# Patient Record
Sex: MALE | Race: WHITE | NOT HISPANIC OR LATINO | Employment: STUDENT | URBAN - METROPOLITAN AREA
[De-identification: names, ages, dates, MRNs, and addresses within clinical notes are randomized per-mention and may not be internally consistent; named-entity substitution may affect disease eponyms.]

---
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
## 2018-03-26 ENCOUNTER — HOSPITAL ENCOUNTER (EMERGENCY)
Facility: HOSPITAL | Age: 18
End: 2018-03-26
Attending: EMERGENCY MEDICINE

## 2018-03-26 VITALS
HEIGHT: 72 IN | DIASTOLIC BLOOD PRESSURE: 80 MMHG | TEMPERATURE: 99 F | SYSTOLIC BLOOD PRESSURE: 141 MMHG | BODY MASS INDEX: 32.23 KG/M2 | RESPIRATION RATE: 18 BRPM | HEART RATE: 101 BPM | WEIGHT: 238 LBS | OXYGEN SATURATION: 98 %

## 2018-03-26 DIAGNOSIS — S02.670B: ICD-10-CM
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
## 2018-03-26 DIAGNOSIS — S00.83XA FACIAL CONTUSION, INITIAL ENCOUNTER: Primary | ICD-10-CM
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
## 2018-03-26 LAB
ALBUMIN SERPL BCP-MCNC: 4.6 G/DL
ALP SERPL-CCNC: 57 U/L
ALT SERPL W/O P-5'-P-CCNC: 67 U/L
ANION GAP SERPL CALC-SCNC: 12 MMOL/L
AST SERPL-CCNC: 40 U/L
BASOPHILS # BLD AUTO: 0.05 K/UL
BASOPHILS NFR BLD: 0.3 %
BILIRUB SERPL-MCNC: 0.5 MG/DL
BUN SERPL-MCNC: 16 MG/DL
CALCIUM SERPL-MCNC: 9.8 MG/DL
CHLORIDE SERPL-SCNC: 106 MMOL/L
CO2 SERPL-SCNC: 22 MMOL/L
CREAT SERPL-MCNC: 1.1 MG/DL
DIFFERENTIAL METHOD: ABNORMAL
EOSINOPHIL # BLD AUTO: 0.1 K/UL
EOSINOPHIL NFR BLD: 0.3 %
ERYTHROCYTE [DISTWIDTH] IN BLOOD BY AUTOMATED COUNT: 13 %
EST. GFR  (AFRICAN AMERICAN): >60 ML/MIN/1.73 M^2
EST. GFR  (NON AFRICAN AMERICAN): >60 ML/MIN/1.73 M^2
GLUCOSE SERPL-MCNC: 94 MG/DL
HCT VFR BLD AUTO: 49.9 %
HGB BLD-MCNC: 16.8 G/DL
IMM GRANULOCYTES # BLD AUTO: 0.08 K/UL
IMM GRANULOCYTES NFR BLD AUTO: 0.5 %
INR PPP: 0.9
LYMPHOCYTES # BLD AUTO: 1.6 K/UL
LYMPHOCYTES NFR BLD: 9.6 %
MCH RBC QN AUTO: 29.3 PG
MCHC RBC AUTO-ENTMCNC: 33.7 G/DL
MCV RBC AUTO: 87 FL
MONOCYTES # BLD AUTO: 0.6 K/UL
MONOCYTES NFR BLD: 3.4 %
NEUTROPHILS # BLD AUTO: 13.9 K/UL
NEUTROPHILS NFR BLD: 85.9 %
NRBC BLD-RTO: 0 /100 WBC
PLATELET # BLD AUTO: 249 K/UL
PMV BLD AUTO: 8.9 FL
POTASSIUM SERPL-SCNC: 4.1 MMOL/L
PROT SERPL-MCNC: 8 G/DL
PROTHROMBIN TIME: 9.8 SEC
RBC # BLD AUTO: 5.73 M/UL
SODIUM SERPL-SCNC: 140 MMOL/L
WBC # BLD AUTO: 16.17 K/UL

## 2018-03-26 PROCEDURE — 85610 PROTHROMBIN TIME: CPT

## 2018-03-26 PROCEDURE — 85025 COMPLETE CBC W/AUTO DIFF WBC: CPT

## 2018-03-26 PROCEDURE — 96376 TX/PRO/DX INJ SAME DRUG ADON: CPT

## 2018-03-26 PROCEDURE — 99285 EMERGENCY DEPT VISIT HI MDM: CPT | Mod: 25

## 2018-03-26 PROCEDURE — 99285 EMERGENCY DEPT VISIT HI MDM: CPT | Mod: ,,, | Performed by: EMERGENCY MEDICINE

## 2018-03-26 PROCEDURE — 96361 HYDRATE IV INFUSION ADD-ON: CPT

## 2018-03-26 PROCEDURE — 96365 THER/PROPH/DIAG IV INF INIT: CPT

## 2018-03-26 PROCEDURE — 80053 COMPREHEN METABOLIC PANEL: CPT

## 2018-03-26 PROCEDURE — 90715 TDAP VACCINE 7 YRS/> IM: CPT | Performed by: EMERGENCY MEDICINE

## 2018-03-26 PROCEDURE — 25000003 PHARM REV CODE 250: Performed by: EMERGENCY MEDICINE

## 2018-03-26 PROCEDURE — 63600175 PHARM REV CODE 636 W HCPCS: Performed by: EMERGENCY MEDICINE

## 2018-03-26 PROCEDURE — S0077 INJECTION, CLINDAMYCIN PHOSP: HCPCS | Performed by: EMERGENCY MEDICINE

## 2018-03-26 PROCEDURE — 90471 IMMUNIZATION ADMIN: CPT | Performed by: EMERGENCY MEDICINE

## 2018-03-26 PROCEDURE — 96375 TX/PRO/DX INJ NEW DRUG ADDON: CPT

## 2018-03-26 RX ORDER — CLINDAMYCIN PHOSPHATE 900 MG/50ML
900 INJECTION, SOLUTION INTRAVENOUS
Status: COMPLETED | OUTPATIENT
Start: 2018-03-26 | End: 2018-03-26

## 2018-03-26 RX ORDER — FENTANYL CITRATE 50 UG/ML
50 INJECTION, SOLUTION INTRAMUSCULAR; INTRAVENOUS
Status: COMPLETED | OUTPATIENT
Start: 2018-03-26 | End: 2018-03-26

## 2018-03-26 RX ADMIN — SODIUM CHLORIDE, POTASSIUM CHLORIDE, SODIUM LACTATE AND CALCIUM CHLORIDE 1000 ML: 600; 310; 30; 20 INJECTION, SOLUTION INTRAVENOUS at 03:03

## 2018-03-26 RX ADMIN — CLOSTRIDIUM TETANI TOXOID ANTIGEN (FORMALDEHYDE INACTIVATED), CORYNEBACTERIUM DIPHTHERIAE TOXOID ANTIGEN (FORMALDEHYDE INACTIVATED), BORDETELLA PERTUSSIS TOXOID ANTIGEN (GLUTARALDEHYDE INACTIVATED), BORDETELLA PERTUSSIS FILAMENTOUS HEMAGGLUTININ ANTIGEN (FORMALDEHYDE INACTIVATED), BORDETELLA PERTUSSIS PERTACTIN ANTIGEN, AND BORDETELLA PERTUSSIS FIMBRIAE 2/3 ANTIGEN 0.5 ML: 5; 2; 2.5; 5; 3; 5 INJECTION, SUSPENSION INTRAMUSCULAR at 03:03

## 2018-03-26 RX ADMIN — CLINDAMYCIN IN 5 PERCENT DEXTROSE 900 MG: 18 INJECTION, SOLUTION INTRAVENOUS at 02:03

## 2018-03-26 RX ADMIN — FENTANYL CITRATE 50 MCG: 50 INJECTION, SOLUTION INTRAMUSCULAR; INTRAVENOUS at 04:03

## 2018-03-26 RX ADMIN — FENTANYL CITRATE 50 MCG: 50 INJECTION, SOLUTION INTRAMUSCULAR; INTRAVENOUS at 03:03
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
## 2018-03-26 NOTE — ED NOTES
Pt resting comfortably and independently repositioned in stretcher with bed locked in lowest position for safety. NAD noted at this time. Respirations even and unlabored and visible chest rise noted.  Patient offered bathroom assistance and denies need at this time. Pt instructed to call if assistance is needed. Call light within reach. Family at bedside. No needs at this time. Will continue to monitor.
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
## 2018-03-26 NOTE — ED NOTES
Plan of care explained and pt verbalized understanding - pt also informed on npo status at present

## 2018-03-26 NOTE — ED NOTES
Pt is accepted at [facility] ED - dr. Dixon is the accepting  [phone]

## 2018-03-26 NOTE — ED NOTES
Dominique, friend to contact for transfer [phone]  Win, grandfather if Dominique doesn't answer [phone]

## 2018-12-02 ENCOUNTER — HOSPITAL ENCOUNTER (EMERGENCY)
Facility: HOSPITAL | Age: 18
Discharge: HOME | End: 2018-12-02
Attending: EMERGENCY MEDICINE

## 2018-12-02 VITALS
OXYGEN SATURATION: 99 % | HEIGHT: 71 IN | TEMPERATURE: 98 F | HEART RATE: 90 BPM | SYSTOLIC BLOOD PRESSURE: 133 MMHG | BODY MASS INDEX: 35.81 KG/M2 | WEIGHT: 255.75 LBS | RESPIRATION RATE: 18 BRPM | DIASTOLIC BLOOD PRESSURE: 78 MMHG

## 2018-12-02 DIAGNOSIS — L02.91 ABSCESS: Primary | ICD-10-CM

## 2018-12-02 PROCEDURE — 99282 EMERGENCY DEPT VISIT SF MDM: CPT | Mod: ,,, | Performed by: PHYSICIAN ASSISTANT

## 2018-12-02 PROCEDURE — 99283 EMERGENCY DEPT VISIT LOW MDM: CPT

## 2018-12-03 NOTE — ED NOTES
Two patient identifiers checked and confirmed.    APPEARANCE: Resting comfortably in no acute distress. Patient has clean hair, skin and nails. Clothing is appropriate and properly fastened.  NEURO: Awake, alert, appropriate for age, and cooperative with a calm affect; pupils equal and round. Oriented x4.  HEENT: Head symmetrical. Bilateral eyes without redness or drainage.  CARDIAC: Regular rate and rhythm. S1, S2 noted.  RESPIRATORY: Airway is open and patent. Respirations are spontaneous on room air, even and unlabored. Normal respiratory effort and rate noted.   GI/: Abdomen soft and non-distended. Patient is reported to void and stool appropriately for age.   NEUROVASCULAR: All extremities are warm and pink with +2 pulses and capillary refill less than 3 seconds. No peripheral edema noted.  MUSCULOSKELETAL: Moves all extremities well; no obvious deformities noted. Pt ambulated independently with steady gait.   SKIN: Warm, dry, and intact. Mucus membranes moist and pink. Small raised red non draining bump noted to pts left neck area. Area around the raised bump reddened.   PSYCH: Pt has calm affect, appropriate speech and thought process.

## 2018-12-03 NOTE — ED TRIAGE NOTES
Pt reports shaving his neck when he cut an area on his left neck where a large hair was leaving a small raised bump. States bump is now going down but area around the bump is red and painful. Denies drainage or fevers.

## 2018-12-03 NOTE — ED PROVIDER NOTES
"Encounter Date: 12/2/2018       History     Chief Complaint   Patient presents with    Cellulitis     skin infection to left side of his neck x 3-4 days 5/10 pain scale w/ redness.      18-year-old white male with history of ADD presents to the ED complaining "staph infection" on the left side of his neck.  States that he was shaving yesterday when he accidentally cut himself.  He developed an area of swelling and redness which he had his girlfriend popped with a needle.  They expressed some blood and a minimal amount of pus.  He has been persistently squeezing the region.  He reports associated pain that was so bad he had to call in to work. He has not applied any cream to the region. He is not currently on any abx. Denies f/c, chest pain, SOB, abdominal pain, headache. Smokes 1ppd.       The history is provided by the patient.     Review of patient's allergies indicates:  No Known Allergies  Past Medical History:   Diagnosis Date    ADD (attention deficit disorder)     Bipolar disorder      Past Surgical History:   Procedure Laterality Date    FRACTURE SURGERY      MANDIBLE SURGERY      TONSILLECTOMY      TYMPANOSTOMY TUBE PLACEMENT       Family History   Problem Relation Age of Onset    No Known Problems Mother     No Known Problems Father      Social History     Tobacco Use    Smoking status: Current Every Day Smoker     Packs/day: 1.00     Types: Cigarettes    Smokeless tobacco: Never Used   Substance Use Topics    Alcohol use: No    Drug use: No     Review of Systems   Constitutional: Negative for chills and fever.   HENT: Negative for congestion, rhinorrhea and sore throat.    Eyes: Negative for photophobia and visual disturbance.   Respiratory: Negative for shortness of breath.    Cardiovascular: Negative for chest pain.   Gastrointestinal: Negative for abdominal pain, constipation, diarrhea, nausea and vomiting.   Genitourinary: Negative for dysuria and hematuria.   Musculoskeletal: Negative " "for neck pain and neck stiffness.   Skin: Positive for color change and wound.   Neurological: Negative for numbness.   Psychiatric/Behavioral: Negative for confusion.       Physical Exam     Initial Vitals [12/02/18 1915]   BP Pulse Resp Temp SpO2   133/78 90 18 97.5 °F (36.4 °C) 99 %      MAP       --         Physical Exam    Nursing note and vitals reviewed.  Constitutional: He appears well-developed and well-nourished. He is not diaphoretic. No distress.   HENT:   Head: Normocephalic and atraumatic.   Neck: Normal range of motion. Neck supple.       Cardiovascular: Normal rate, regular rhythm and normal heart sounds. Exam reveals no gallop and no friction rub.    No murmur heard.  Pulmonary/Chest: Breath sounds normal. He has no wheezes. He has no rhonchi. He has no rales.   Abdominal: Soft. Bowel sounds are normal. There is no tenderness. There is no rebound and no guarding.   Musculoskeletal: Normal range of motion.   Neurological: He is alert and oriented to person, place, and time.   Skin: Skin is warm and dry. No erythema.   Psychiatric: He has a normal mood and affect.         ED Course   Procedures  Labs Reviewed - No data to display       Imaging Results    None                APC / Resident Notes:   18-year-old white male with history of ADD presents to the ED complaining "staph infection" on the left side of his neck. VSS. There is a small area of erythema noted to the L neck, tender to palpation. No fluctuance. No surrounding cellulitis. I do not feel that he needs I&D or abx at this time. Instructed to apply warm compresses. Stable for discharge.    He was discharged without any new prescriptions.  He will follow up with his PCP.  All of the patient's questions were answered.  I reviewed the patient's chart and discussed the case with my supervising physician.                    Clinical Impression:   The encounter diagnosis was Abscess.      Disposition:   Disposition: Discharged  Condition: " Stable                        Gita Rose PA-C  12/02/18 7241

## 2020-12-01 ENCOUNTER — HOSPITAL ENCOUNTER (EMERGENCY)
Facility: HOSPITAL | Age: 20
Discharge: HOME | End: 2020-12-01
Attending: EMERGENCY MEDICINE
Payer: MEDICAID

## 2020-12-01 VITALS
SYSTOLIC BLOOD PRESSURE: 140 MMHG | RESPIRATION RATE: 18 BRPM | WEIGHT: 257.25 LBS | OXYGEN SATURATION: 97 % | HEART RATE: 93 BPM | DIASTOLIC BLOOD PRESSURE: 85 MMHG | TEMPERATURE: 99 F | BODY MASS INDEX: 34.84 KG/M2 | HEIGHT: 72 IN

## 2020-12-01 DIAGNOSIS — M70.52 BURSITIS OF LEFT KNEE, UNSPECIFIED BURSA: Primary | ICD-10-CM

## 2020-12-01 PROCEDURE — 99284 PR EMERGENCY DEPT VISIT,LEVEL IV: ICD-10-PCS | Mod: ,,, | Performed by: NURSE PRACTITIONER

## 2020-12-01 PROCEDURE — 99284 EMERGENCY DEPT VISIT MOD MDM: CPT | Mod: ,,, | Performed by: NURSE PRACTITIONER

## 2020-12-01 PROCEDURE — 25000003 PHARM REV CODE 250: Performed by: NURSE PRACTITIONER

## 2020-12-01 PROCEDURE — 99283 EMERGENCY DEPT VISIT LOW MDM: CPT

## 2020-12-01 RX ORDER — IBUPROFEN 600 MG/1
600 TABLET ORAL EVERY 6 HOURS PRN
Qty: 20 TABLET | Refills: 0 | Status: SHIPPED | OUTPATIENT
Start: 2020-12-01 | End: 2020-12-06

## 2020-12-01 RX ORDER — IBUPROFEN 600 MG/1
600 TABLET ORAL
Status: COMPLETED | OUTPATIENT
Start: 2020-12-01 | End: 2020-12-01

## 2020-12-01 RX ADMIN — IBUPROFEN 600 MG: 600 TABLET, FILM COATED ORAL at 09:12

## 2020-12-01 NOTE — DISCHARGE INSTRUCTIONS
We have prescribed you ibuprofen for your pain.  We have applied an Ace wrap for swelling.  Ice or heat for comfort.  Follow-up with PCP if symptoms do not resolve in the next 10 days    Our goal in the emergency department is to always give you outstanding care and exceptional service. You may receive a survey by mail or e-mail in the next week regarding your experience in our ED. We would greatly appreciate your completing and returning the survey. Your feedback provides us with a way to recognize our staff who give very good care and it helps us learn how to improve when your experience was below our aspiration of excellence.

## 2020-12-01 NOTE — ED PROVIDER NOTES
Encounter Date: 12/1/2020       History     Chief Complaint   Patient presents with    Knee Injury     L knee swelling      Patient is a 20-year-old male with medical history of bipolar disorder, ADD presenting to the ED for slight left knee swelling for the past few weeks.  Patient states he had a fall from a horse a few months ago and has had intermittent swelling since that time.  Patient does work on his feet and states swelling is worse after working.  Patient denies any recent falls, trauma, insect bites, redness.  Patient states he has been using ice for swelling but has not taking anything for pain.    The history is provided by the patient.     Review of patient's allergies indicates:  No Known Allergies  Past Medical History:   Diagnosis Date    ADD (attention deficit disorder)     Bipolar disorder      Past Surgical History:   Procedure Laterality Date    FRACTURE SURGERY      MANDIBLE SURGERY      TONSILLECTOMY      TYMPANOSTOMY TUBE PLACEMENT       Family History   Problem Relation Age of Onset    No Known Problems Mother     No Known Problems Father      Social History     Tobacco Use    Smoking status: Current Every Day Smoker     Packs/day: 1.00     Types: Cigarettes    Smokeless tobacco: Never Used   Substance Use Topics    Alcohol use: No    Drug use: No     Review of Systems   Constitutional: Negative for fever.   HENT: Negative for sore throat.    Respiratory: Negative for shortness of breath.    Cardiovascular: Negative for chest pain.   Gastrointestinal: Negative for nausea.   Genitourinary: Negative for dysuria.   Musculoskeletal: Positive for arthralgias ( left knee) and joint swelling ( left knee). Negative for back pain and gait problem.   Skin: Negative for color change, rash and wound.   Neurological: Negative for weakness.   Hematological: Does not bruise/bleed easily.   All other systems reviewed and are negative.      Physical Exam     Initial Vitals [12/01/20 0820]   BP  Pulse Resp Temp SpO2   (!) 140/85 93 18 99 °F (37.2 °C) 97 %      MAP       --         Physical Exam    Nursing note and vitals reviewed.  Constitutional: Vital signs are normal. He appears well-developed and well-nourished. He is cooperative. No distress.   HENT:   Head: Normocephalic and atraumatic.   Mouth/Throat: Mucous membranes are normal.   Eyes: EOM and lids are normal. Pupils are equal, round, and reactive to light.   Neck: Trachea normal, normal range of motion and phonation normal. Neck supple.   Cardiovascular: Normal rate, regular rhythm and intact distal pulses.   Pulses:       Radial pulses are 2+ on the right side and 2+ on the left side.   Pulmonary/Chest: Effort normal and breath sounds normal.   Abdominal: Normal appearance.   Musculoskeletal:      Left hip: Normal.      Left knee: He exhibits decreased range of motion and swelling ( slight). He exhibits no effusion, no ecchymosis, no deformity, no laceration, no erythema, normal patellar mobility, no bony tenderness and normal meniscus. Tenderness found.      Left upper leg: Normal.      Left lower leg: Normal.   Neurological: He is alert and oriented to person, place, and time. He has normal strength. No sensory deficit. He displays a negative Romberg sign. GCS eye subscore is 4. GCS verbal subscore is 5. GCS motor subscore is 6.   Skin: Skin is warm, dry and intact. Capillary refill takes 2 to 3 seconds. No abrasion, no laceration and no rash noted. No cyanosis. Nails show no clubbing.         ED Course   Procedures  Labs Reviewed - No data to display       Imaging Results    None          Medical Decision Making:   Initial Assessment:   Emergent evaluation by 20-year-old male presenting to the ED for nontraumatic left knee swelling and slight pain.  Patient states symptoms have been ongoing for the past few weeks.  Patient states symptoms are worse after a fall from a horse a few months ago.  Patient denies taking anything for pain but has  applied ice for swelling.  Patient denies any recent falls, trauma or insect bites.  On exam patient is A&O x3.  Vital signs stable.  Not febrile and nontoxic appearing.  Strength 5/5 in bilateral lower extremities.  Steady gait appreciated.  No reproducible pain to palpation to left knee.  Good extension flexion of left knee.  No redness, erythema or drainage noted.  Slight swelling appreciated on exam.  No clinical signs of effusion noted.  Differential Diagnosis:   Differential diagnoses include but are not limited to tear, ACL tear, tendonitis, bursitis, gout,arthritis, others.  I considered but do not suspect dislocation,  septic joint, fracture.  ED Management:  I do not feel labs or imaging are pertinent for the care this patient.  We will medicate, apply Ace wrap and discharged home.    Patient advised ice or heat for comfort.  Maintain movement and stretches.  Ibuprofen for pain.  Follow-up with PCP or ortho as needed if symptoms do not resolve.  Patient verbalized understanding of this plan of care.  All questions and concerns addressed.    Patient is hemodynamically stable, vital signs are normal. Discharge instructions given. Return to ED precautions discussed. Follow up as directed. Pt verbalized understanding of this plan.  Pt is stable for discharge.                              Clinical Impression:       ICD-10-CM ICD-9-CM   1. Bursitis of left knee, unspecified bursa  M70.52 726.60                      Disposition:   Disposition: Discharged  Condition: Stable     ED Disposition Condition    Discharge Stable        ED Prescriptions     Medication Sig Dispense Start Date End Date Auth. Provider    ibuprofen (ADVIL,MOTRIN) 600 MG tablet Take 1 tablet (600 mg total) by mouth every 6 (six) hours as needed for Pain. 20 tablet 12/1/2020 12/6/2020 Danica Rush NP        Follow-up Information     Follow up With Specialties Details Why Contact Info Additional Information    [facility]  [place]  Schedule an appointment as soon as possible for a visit  As needed [address] [facility] [address]  [phone]       [facility] [address] Orthopedics Schedule an appointment as soon as possible for a visit  As needed, If symptoms worsen [address], [address]  [facility] [address] [phone] [facility] - Main Building, 5th Floor Please park in [facility] and take [address] elevator                                       Danica Rush NP  12/01/20 0901

## 2020-12-01 NOTE — ED TRIAGE NOTES
Pt reports he fell off a horse a few months ago and has been having intermittent L knee pain since.  Reports this am L knee started hurting again with some swelling.  Denies recent injury.

## 2020-12-01 NOTE — Clinical Note
"Jay Kennedy" Cory was seen and treated in our emergency department on 12/1/2020.  He may return to work on 12/02/2020.       If you have any questions or concerns, please don't hesitate to call.      Danica Rush NP"

## 2020-12-22 ENCOUNTER — HOSPITAL ENCOUNTER (EMERGENCY)
Facility: HOSPITAL | Age: 20
Discharge: HOME | End: 2020-12-22
Attending: EMERGENCY MEDICINE
Payer: MEDICAID

## 2020-12-22 VITALS
DIASTOLIC BLOOD PRESSURE: 72 MMHG | TEMPERATURE: 98 F | OXYGEN SATURATION: 95 % | HEART RATE: 90 BPM | SYSTOLIC BLOOD PRESSURE: 144 MMHG | RESPIRATION RATE: 20 BRPM

## 2020-12-22 DIAGNOSIS — M25.562 LEFT KNEE PAIN, UNSPECIFIED CHRONICITY: Primary | ICD-10-CM

## 2020-12-22 DIAGNOSIS — S89.92XA KNEE INJURY, LEFT, INITIAL ENCOUNTER: ICD-10-CM

## 2020-12-22 PROCEDURE — 99284 EMERGENCY DEPT VISIT MOD MDM: CPT | Mod: ,,, | Performed by: PHYSICIAN ASSISTANT

## 2020-12-22 PROCEDURE — 99283 EMERGENCY DEPT VISIT LOW MDM: CPT | Mod: 25

## 2020-12-22 PROCEDURE — 99284 PR EMERGENCY DEPT VISIT,LEVEL IV: ICD-10-PCS | Mod: ,,, | Performed by: PHYSICIAN ASSISTANT

## 2020-12-22 NOTE — Clinical Note
"Jay Kennedy" Cory was seen and treated in our emergency department on 12/22/2020.  He may return to work on 12/23/2020.       If you have any questions or concerns, please don't hesitate to call.      TYSON Bhandari/ Donn MACIAS    "

## 2020-12-22 NOTE — DISCHARGE INSTRUCTIONS
Your knee x-ray did not show any broken bones or dislocations.  Use Ace wrap, apply ice, and keep the knee elevated to reduce pain and swelling.  You can continue to take your prescription ibuprofen.  You can also take Tylenol in addition to the ibuprofen as needed for pain.     Follow-up in [facility] within the next week if your symptoms are not improving.    Return to the ER for any new or worsening symptoms such as worsening swelling, numbness or tingling, redness or warmth to the area, fevers greater than 100.4° or any other worrisome symptoms.    Our goal in the emergency department is to always give you outstanding care and exceptional service. You may receive a survey by mail or e-mail in the next week regarding your experience in our ED. We would greatly appreciate your completing and returning the survey. Your feedback provides us with a way to recognize our staff who give very good care and it helps us learn how to improve when your experience was below our aspiration of excellence.
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
## 2021-01-26 ENCOUNTER — HOSPITAL ENCOUNTER (EMERGENCY)
Facility: HOSPITAL | Age: 21
Discharge: HOME | End: 2021-01-26
Attending: EMERGENCY MEDICINE
Payer: MEDICAID

## 2021-01-26 VITALS
WEIGHT: 251.13 LBS | DIASTOLIC BLOOD PRESSURE: 82 MMHG | HEIGHT: 73 IN | SYSTOLIC BLOOD PRESSURE: 144 MMHG | RESPIRATION RATE: 18 BRPM | TEMPERATURE: 98 F | HEART RATE: 100 BPM | BODY MASS INDEX: 33.28 KG/M2 | OXYGEN SATURATION: 96 %

## 2021-01-26 DIAGNOSIS — M54.50 ACUTE BILATERAL LOW BACK PAIN WITHOUT SCIATICA: Primary | ICD-10-CM

## 2021-01-26 PROCEDURE — 86803 HEPATITIS C AB TEST: CPT

## 2021-01-26 PROCEDURE — 99284 EMERGENCY DEPT VISIT MOD MDM: CPT | Mod: ,,, | Performed by: EMERGENCY MEDICINE

## 2021-01-26 PROCEDURE — 86703 HIV-1/HIV-2 1 RESULT ANTBDY: CPT

## 2021-01-26 PROCEDURE — 25000003 PHARM REV CODE 250: Performed by: EMERGENCY MEDICINE

## 2021-01-26 PROCEDURE — 99284 PR EMERGENCY DEPT VISIT,LEVEL IV: ICD-10-PCS | Mod: ,,, | Performed by: EMERGENCY MEDICINE

## 2021-01-26 PROCEDURE — 99283 EMERGENCY DEPT VISIT LOW MDM: CPT | Mod: 25

## 2021-01-26 RX ORDER — ACETAMINOPHEN 500 MG
1000 TABLET ORAL
Status: COMPLETED | OUTPATIENT
Start: 2021-01-26 | End: 2021-01-26

## 2021-01-26 RX ORDER — ACETAMINOPHEN 500 MG
TABLET ORAL
Status: DISPENSED
Start: 2021-01-26 | End: 2021-01-27

## 2021-01-26 RX ADMIN — ACETAMINOPHEN 1000 MG: 500 TABLET ORAL at 02:01

## 2021-01-28 LAB
HCV AB SERPL QL IA: NEGATIVE
HIV 1+2 AB+HIV1 P24 AG SERPL QL IA: NEGATIVE

## 2021-02-23 ENCOUNTER — HOSPITAL ENCOUNTER (EMERGENCY)
Facility: HOSPITAL | Age: 21
Discharge: HOME | End: 2021-02-23
Attending: EMERGENCY MEDICINE
Payer: MEDICAID

## 2021-02-23 VITALS
TEMPERATURE: 98 F | RESPIRATION RATE: 18 BRPM | BODY MASS INDEX: 33.04 KG/M2 | SYSTOLIC BLOOD PRESSURE: 129 MMHG | HEART RATE: 92 BPM | DIASTOLIC BLOOD PRESSURE: 81 MMHG | WEIGHT: 249.31 LBS | OXYGEN SATURATION: 97 % | HEIGHT: 73 IN

## 2021-02-23 DIAGNOSIS — S83.92XD SPRAIN OF LEFT KNEE, UNSPECIFIED LIGAMENT, SUBSEQUENT ENCOUNTER: Primary | ICD-10-CM

## 2021-02-23 PROCEDURE — 99284 EMERGENCY DEPT VISIT MOD MDM: CPT | Mod: ,,, | Performed by: EMERGENCY MEDICINE

## 2021-02-23 PROCEDURE — 99283 EMERGENCY DEPT VISIT LOW MDM: CPT

## 2021-02-23 PROCEDURE — 99284 PR EMERGENCY DEPT VISIT,LEVEL IV: ICD-10-PCS | Mod: ,,, | Performed by: EMERGENCY MEDICINE

## 2021-02-23 RX ORDER — IBUPROFEN 600 MG/1
600 TABLET ORAL EVERY 8 HOURS PRN
Qty: 20 TABLET | Refills: 0 | Status: SHIPPED | OUTPATIENT
Start: 2021-02-23 | End: 2021-03-02

## 2021-02-24 ENCOUNTER — PATIENT OUTREACH (OUTPATIENT)
Dept: EMERGENCY MEDICINE | Facility: HOSPITAL | Age: 21
End: 2021-02-24

## 2021-04-20 ENCOUNTER — HOSPITAL ENCOUNTER (EMERGENCY)
Facility: HOSPITAL | Age: 21
Discharge: HOME | End: 2021-04-20
Attending: EMERGENCY MEDICINE
Payer: MEDICAID

## 2021-04-20 VITALS
WEIGHT: 252.44 LBS | OXYGEN SATURATION: 96 % | HEIGHT: 73 IN | SYSTOLIC BLOOD PRESSURE: 130 MMHG | TEMPERATURE: 98 F | HEART RATE: 113 BPM | RESPIRATION RATE: 18 BRPM | DIASTOLIC BLOOD PRESSURE: 86 MMHG | BODY MASS INDEX: 33.46 KG/M2

## 2021-04-20 DIAGNOSIS — F17.200 CURRENT SMOKER: ICD-10-CM

## 2021-04-20 DIAGNOSIS — J06.9 URI WITH COUGH AND CONGESTION: Primary | ICD-10-CM

## 2021-04-20 DIAGNOSIS — R05.8 PRODUCTIVE COUGH: ICD-10-CM

## 2021-04-20 LAB
CTP QC/QA: YES
SARS-COV-2 RDRP RESP QL NAA+PROBE: NEGATIVE

## 2021-04-20 PROCEDURE — 99284 EMERGENCY DEPT VISIT MOD MDM: CPT | Mod: CS,,, | Performed by: EMERGENCY MEDICINE

## 2021-04-20 PROCEDURE — 25000003 PHARM REV CODE 250: Performed by: EMERGENCY MEDICINE

## 2021-04-20 PROCEDURE — U0002 COVID-19 LAB TEST NON-CDC: HCPCS | Performed by: EMERGENCY MEDICINE

## 2021-04-20 PROCEDURE — 99284 PR EMERGENCY DEPT VISIT,LEVEL IV: ICD-10-PCS | Mod: CS,,, | Performed by: EMERGENCY MEDICINE

## 2021-04-20 PROCEDURE — 99284 EMERGENCY DEPT VISIT MOD MDM: CPT | Mod: 25

## 2021-04-20 RX ORDER — DOXYCYCLINE 100 MG/1
100 CAPSULE ORAL 2 TIMES DAILY
Qty: 19 CAPSULE | Refills: 0 | Status: SHIPPED | OUTPATIENT
Start: 2021-04-20 | End: 2021-04-30

## 2021-04-20 RX ORDER — CETIRIZINE HYDROCHLORIDE 10 MG/1
10 TABLET ORAL EVERY MORNING
Qty: 30 TABLET | Refills: 0 | Status: SHIPPED | OUTPATIENT
Start: 2021-04-20 | End: 2021-05-20

## 2021-04-20 RX ORDER — FLUTICASONE PROPIONATE 50 MCG
1 SPRAY, SUSPENSION (ML) NASAL 2 TIMES DAILY
Qty: 11.1 ML | Refills: 1 | Status: SHIPPED | OUTPATIENT
Start: 2021-04-20 | End: 2021-06-19

## 2021-04-20 RX ORDER — DOXYCYCLINE HYCLATE 100 MG
100 TABLET ORAL
Status: COMPLETED | OUTPATIENT
Start: 2021-04-20 | End: 2021-04-20

## 2021-04-20 RX ADMIN — DOXYCYCLINE HYCLATE 100 MG: 100 TABLET, COATED ORAL at 07:04

## 2021-05-26 ENCOUNTER — HOSPITAL ENCOUNTER (EMERGENCY)
Facility: HOSPITAL | Age: 21
Discharge: HOME | End: 2021-05-26
Attending: EMERGENCY MEDICINE
Payer: MEDICAID

## 2021-05-26 VITALS
HEART RATE: 86 BPM | TEMPERATURE: 98 F | DIASTOLIC BLOOD PRESSURE: 74 MMHG | RESPIRATION RATE: 20 BRPM | OXYGEN SATURATION: 96 % | HEIGHT: 72 IN | SYSTOLIC BLOOD PRESSURE: 142 MMHG | WEIGHT: 250 LBS | BODY MASS INDEX: 33.86 KG/M2

## 2021-05-26 DIAGNOSIS — M25.562 PAIN AND SWELLING OF KNEE, LEFT: ICD-10-CM
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
## 2021-05-26 DIAGNOSIS — M25.462 PAIN AND SWELLING OF KNEE, LEFT: ICD-10-CM

## 2021-05-26 DIAGNOSIS — M25.561 PAIN AND SWELLING OF KNEE, RIGHT: ICD-10-CM

## 2021-05-26 DIAGNOSIS — M25.461 PAIN AND SWELLING OF KNEE, RIGHT: ICD-10-CM

## 2021-05-26 DIAGNOSIS — M25.561 RECURRENT PAIN OF BOTH KNEES: Primary | ICD-10-CM

## 2021-05-26 DIAGNOSIS — M25.562 RECURRENT PAIN OF BOTH KNEES: Primary | ICD-10-CM

## 2021-05-26 PROCEDURE — 99283 EMERGENCY DEPT VISIT LOW MDM: CPT | Mod: 25

## 2021-05-26 PROCEDURE — 99284 PR EMERGENCY DEPT VISIT,LEVEL IV: ICD-10-PCS | Mod: ,,, | Performed by: EMERGENCY MEDICINE

## 2021-05-26 PROCEDURE — 25000003 PHARM REV CODE 250: Performed by: PHYSICIAN ASSISTANT

## 2021-05-26 PROCEDURE — 99284 EMERGENCY DEPT VISIT MOD MDM: CPT | Mod: ,,, | Performed by: EMERGENCY MEDICINE

## 2021-05-26 RX ORDER — NAPROXEN SODIUM 220 MG
400 TABLET ORAL
COMMUNITY
End: 2021-05-26

## 2021-05-26 RX ORDER — KETOROLAC TROMETHAMINE 10 MG/1
10 TABLET, FILM COATED ORAL
Status: COMPLETED | OUTPATIENT
Start: 2021-05-26 | End: 2021-05-26

## 2021-05-26 RX ORDER — MELOXICAM 15 MG/1
15 TABLET ORAL DAILY
Qty: 10 TABLET | Refills: 0 | Status: SHIPPED | OUTPATIENT
Start: 2021-05-26 | End: 2021-06-05

## 2021-05-26 RX ADMIN — KETOROLAC TROMETHAMINE 10 MG: 10 TABLET, FILM COATED ORAL at 10:05

## 2021-06-22 PROCEDURE — 99283 PR EMERGENCY DEPT VISIT,LEVEL III: ICD-10-PCS | Mod: ,,, | Performed by: EMERGENCY MEDICINE

## 2021-06-22 PROCEDURE — 99283 EMERGENCY DEPT VISIT LOW MDM: CPT | Mod: ,,, | Performed by: EMERGENCY MEDICINE

## 2021-06-22 PROCEDURE — 99283 EMERGENCY DEPT VISIT LOW MDM: CPT | Mod: 25

## 2021-06-22 PROCEDURE — 29130 APPL FINGER SPLINT STATIC: CPT | Mod: F4

## 2021-06-23 ENCOUNTER — HOSPITAL ENCOUNTER (EMERGENCY)
Facility: HOSPITAL | Age: 21
Discharge: HOME | End: 2021-06-23
Attending: EMERGENCY MEDICINE
Payer: MEDICAID

## 2021-06-23 VITALS
OXYGEN SATURATION: 96 % | RESPIRATION RATE: 14 BRPM | DIASTOLIC BLOOD PRESSURE: 95 MMHG | TEMPERATURE: 98 F | SYSTOLIC BLOOD PRESSURE: 121 MMHG | HEART RATE: 95 BPM | WEIGHT: 250 LBS | BODY MASS INDEX: 33.91 KG/M2

## 2021-06-23 DIAGNOSIS — M79.645 FINGER PAIN, LEFT: ICD-10-CM

## 2021-06-23 DIAGNOSIS — S62.639A CLOSED DISPLACED FRACTURE OF DISTAL PHALANX OF FINGER OF LEFT HAND: Primary | ICD-10-CM
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
## 2021-06-23 PROCEDURE — 29130 APPL FINGER SPLINT STATIC: CPT | Mod: F4

## 2021-06-23 PROCEDURE — 25000003 PHARM REV CODE 250: Performed by: EMERGENCY MEDICINE

## 2021-06-23 RX ORDER — IBUPROFEN 600 MG/1
600 TABLET ORAL
Status: COMPLETED | OUTPATIENT
Start: 2021-06-23 | End: 2021-06-23

## 2021-06-23 RX ORDER — IBUPROFEN 600 MG/1
600 TABLET ORAL EVERY 6 HOURS PRN
Qty: 20 TABLET | Refills: 0 | Status: SHIPPED | OUTPATIENT
Start: 2021-06-23 | End: 2021-06-28

## 2021-06-23 RX ADMIN — IBUPROFEN 600 MG: 600 TABLET, FILM COATED ORAL at 12:06

## 2021-08-19 ENCOUNTER — HOSPITAL ENCOUNTER (EMERGENCY)
Facility: HOSPITAL | Age: 21
Discharge: HOME | End: 2021-08-19
Attending: EMERGENCY MEDICINE
Payer: MEDICAID

## 2021-08-19 VITALS
TEMPERATURE: 99 F | HEART RATE: 100 BPM | HEIGHT: 71 IN | DIASTOLIC BLOOD PRESSURE: 89 MMHG | RESPIRATION RATE: 18 BRPM | WEIGHT: 275 LBS | OXYGEN SATURATION: 97 % | SYSTOLIC BLOOD PRESSURE: 147 MMHG | BODY MASS INDEX: 38.5 KG/M2

## 2021-08-19 DIAGNOSIS — R05.9 COUGH: ICD-10-CM

## 2021-08-19 DIAGNOSIS — Z20.822 COVID-19 VIRUS NOT DETECTED: Primary | ICD-10-CM

## 2021-08-19 LAB
CTP QC/QA: YES
SARS-COV-2 RDRP RESP QL NAA+PROBE: NEGATIVE

## 2021-08-19 PROCEDURE — U0002 COVID-19 LAB TEST NON-CDC: HCPCS | Performed by: EMERGENCY MEDICINE

## 2021-08-19 PROCEDURE — 99284 EMERGENCY DEPT VISIT MOD MDM: CPT | Mod: CS,,, | Performed by: PHYSICIAN ASSISTANT

## 2021-08-19 PROCEDURE — 99284 PR EMERGENCY DEPT VISIT,LEVEL IV: ICD-10-PCS | Mod: CS,,, | Performed by: PHYSICIAN ASSISTANT

## 2021-08-19 PROCEDURE — 99282 EMERGENCY DEPT VISIT SF MDM: CPT

## 2021-09-03 ENCOUNTER — HOSPITAL ENCOUNTER (EMERGENCY)
Facility: HOSPITAL | Age: 21
Discharge: HOME | End: 2021-09-03
Attending: EMERGENCY MEDICINE
Payer: MEDICAID

## 2021-09-03 VITALS
SYSTOLIC BLOOD PRESSURE: 139 MMHG | TEMPERATURE: 99 F | HEART RATE: 100 BPM | OXYGEN SATURATION: 98 % | RESPIRATION RATE: 18 BRPM | DIASTOLIC BLOOD PRESSURE: 80 MMHG

## 2021-09-03 DIAGNOSIS — F63.81 INTERMITTENT EXPLOSIVE DISORDER IN ADULT: Chronic | ICD-10-CM

## 2021-09-03 DIAGNOSIS — R45.1 AGITATION: Primary | ICD-10-CM

## 2021-09-03 LAB
ALBUMIN SERPL BCP-MCNC: 4.2 G/DL (ref 3.5–5.2)
ALP SERPL-CCNC: 41 U/L (ref 55–135)
ALT SERPL W/O P-5'-P-CCNC: 26 U/L (ref 10–44)
AMPHET+METHAMPHET UR QL: NEGATIVE
ANION GAP SERPL CALC-SCNC: 9 MMOL/L (ref 8–16)
APAP SERPL-MCNC: <3 UG/ML (ref 10–20)
AST SERPL-CCNC: 24 U/L (ref 10–40)
BARBITURATES UR QL SCN>200 NG/ML: NEGATIVE
BASOPHILS # BLD AUTO: 0.03 K/UL (ref 0–0.2)
BASOPHILS NFR BLD: 0.4 % (ref 0–1.9)
BENZODIAZ UR QL SCN>200 NG/ML: NEGATIVE
BILIRUB SERPL-MCNC: 1 MG/DL (ref 0.1–1)
BILIRUB UR QL STRIP: NEGATIVE
BUN SERPL-MCNC: 9 MG/DL (ref 6–20)
BZE UR QL SCN: NEGATIVE
CALCIUM SERPL-MCNC: 9.2 MG/DL (ref 8.7–10.5)
CANNABINOIDS UR QL SCN: ABNORMAL
CHLORIDE SERPL-SCNC: 105 MMOL/L (ref 95–110)
CLARITY UR REFRACT.AUTO: CLEAR
CO2 SERPL-SCNC: 22 MMOL/L (ref 23–29)
COLOR UR AUTO: NORMAL
CREAT SERPL-MCNC: 0.9 MG/DL (ref 0.5–1.4)
CREAT UR-MCNC: 45 MG/DL (ref 23–375)
CTP QC/QA: YES
DIFFERENTIAL METHOD: ABNORMAL
EOSINOPHIL # BLD AUTO: 0.1 K/UL (ref 0–0.5)
EOSINOPHIL NFR BLD: 1.6 % (ref 0–8)
ERYTHROCYTE [DISTWIDTH] IN BLOOD BY AUTOMATED COUNT: 13 % (ref 11.5–14.5)
EST. GFR  (AFRICAN AMERICAN): >60 ML/MIN/1.73 M^2
EST. GFR  (NON AFRICAN AMERICAN): >60 ML/MIN/1.73 M^2
ETHANOL SERPL-MCNC: <10 MG/DL
GLUCOSE SERPL-MCNC: 113 MG/DL (ref 70–110)
GLUCOSE UR QL STRIP: NEGATIVE
HCT VFR BLD AUTO: 44.9 % (ref 40–54)
HCV AB SERPL QL IA: NEGATIVE
HGB BLD-MCNC: 15.7 G/DL (ref 14–18)
HGB UR QL STRIP: NEGATIVE
HIV 1+2 AB+HIV1 P24 AG SERPL QL IA: NEGATIVE
IMM GRANULOCYTES # BLD AUTO: 0.03 K/UL (ref 0–0.04)
IMM GRANULOCYTES NFR BLD AUTO: 0.4 % (ref 0–0.5)
KETONES UR QL STRIP: NEGATIVE
LEUKOCYTE ESTERASE UR QL STRIP: NEGATIVE
LYMPHOCYTES # BLD AUTO: 1.8 K/UL (ref 1–4.8)
LYMPHOCYTES NFR BLD: 26.5 % (ref 18–48)
MCH RBC QN AUTO: 29.8 PG (ref 27–31)
MCHC RBC AUTO-ENTMCNC: 35 G/DL (ref 32–36)
MCV RBC AUTO: 85 FL (ref 82–98)
METHADONE UR QL SCN>300 NG/ML: NEGATIVE
MONOCYTES # BLD AUTO: 0.3 K/UL (ref 0.3–1)
MONOCYTES NFR BLD: 4.2 % (ref 4–15)
NEUTROPHILS # BLD AUTO: 4.7 K/UL (ref 1.8–7.7)
NEUTROPHILS NFR BLD: 66.9 % (ref 38–73)
NITRITE UR QL STRIP: NEGATIVE
NRBC BLD-RTO: 0 /100 WBC
OPIATES UR QL SCN: NEGATIVE
PCP UR QL SCN>25 NG/ML: NEGATIVE
PH UR STRIP: 7 [PH] (ref 5–8)
PLATELET # BLD AUTO: 268 K/UL (ref 150–450)
PMV BLD AUTO: 8.7 FL (ref 9.2–12.9)
POTASSIUM SERPL-SCNC: 3.8 MMOL/L (ref 3.5–5.1)
PROT SERPL-MCNC: 6.6 G/DL (ref 6–8.4)
PROT UR QL STRIP: NEGATIVE
RBC # BLD AUTO: 5.26 M/UL (ref 4.6–6.2)
SARS-COV-2 RDRP RESP QL NAA+PROBE: NEGATIVE
SODIUM SERPL-SCNC: 136 MMOL/L (ref 136–145)
SP GR UR STRIP: 1 (ref 1–1.03)
TOXICOLOGY INFORMATION: ABNORMAL
TSH SERPL DL<=0.005 MIU/L-ACNC: 0.48 UIU/ML (ref 0.4–4)
URN SPEC COLLECT METH UR: NORMAL
WBC # BLD AUTO: 6.95 K/UL (ref 3.9–12.7)

## 2021-09-03 PROCEDURE — 99284 EMERGENCY DEPT VISIT MOD MDM: CPT | Mod: CS,,, | Performed by: EMERGENCY MEDICINE

## 2021-09-03 PROCEDURE — 82077 ASSAY SPEC XCP UR&BREATH IA: CPT | Performed by: EMERGENCY MEDICINE

## 2021-09-03 PROCEDURE — 80307 DRUG TEST PRSMV CHEM ANLYZR: CPT | Performed by: EMERGENCY MEDICINE

## 2021-09-03 PROCEDURE — 81003 URINALYSIS AUTO W/O SCOPE: CPT | Mod: 59 | Performed by: EMERGENCY MEDICINE

## 2021-09-03 PROCEDURE — 80143 DRUG ASSAY ACETAMINOPHEN: CPT | Performed by: EMERGENCY MEDICINE

## 2021-09-03 PROCEDURE — 90792 PR PSYCHIATRIC DIAGNOSTIC EVALUATION W/MEDICAL SERVICES: ICD-10-PCS | Mod: AF,HB,, | Performed by: PSYCHIATRY & NEUROLOGY

## 2021-09-03 PROCEDURE — 84443 ASSAY THYROID STIM HORMONE: CPT | Performed by: EMERGENCY MEDICINE

## 2021-09-03 PROCEDURE — 86803 HEPATITIS C AB TEST: CPT | Performed by: EMERGENCY MEDICINE

## 2021-09-03 PROCEDURE — 85025 COMPLETE CBC W/AUTO DIFF WBC: CPT | Performed by: EMERGENCY MEDICINE

## 2021-09-03 PROCEDURE — 99284 PR EMERGENCY DEPT VISIT,LEVEL IV: ICD-10-PCS | Mod: CS,,, | Performed by: EMERGENCY MEDICINE

## 2021-09-03 PROCEDURE — 90792 PSYCH DIAG EVAL W/MED SRVCS: CPT | Mod: AF,HB,, | Performed by: PSYCHIATRY & NEUROLOGY

## 2021-09-03 PROCEDURE — 80053 COMPREHEN METABOLIC PANEL: CPT | Performed by: EMERGENCY MEDICINE

## 2021-09-03 PROCEDURE — 99285 EMERGENCY DEPT VISIT HI MDM: CPT

## 2021-09-03 PROCEDURE — 87389 HIV-1 AG W/HIV-1&-2 AB AG IA: CPT | Performed by: EMERGENCY MEDICINE

## 2021-09-03 PROCEDURE — U0002 COVID-19 LAB TEST NON-CDC: HCPCS | Performed by: EMERGENCY MEDICINE

## 2022-11-27 ENCOUNTER — HOSPITAL ENCOUNTER (EMERGENCY)
Facility: HOSPITAL | Age: 22
Discharge: PSYCHIATRIC OR SUBSTANCE USE FACILITY | End: 2022-11-28
Attending: EMERGENCY MEDICINE
Payer: MEDICAID

## 2022-11-27 DIAGNOSIS — R45.850 HOMICIDAL IDEATION: Primary | ICD-10-CM

## 2022-11-27 LAB
ALBUMIN SERPL BCP-MCNC: 4.9 G/DL (ref 3.5–5.2)
ALP SERPL-CCNC: 47 U/L (ref 55–135)
ALT SERPL W/O P-5'-P-CCNC: 43 U/L (ref 10–44)
AMPHET+METHAMPHET UR QL: NEGATIVE
ANION GAP SERPL CALC-SCNC: 14 MMOL/L (ref 8–16)
APAP SERPL-MCNC: <3 UG/ML (ref 10–20)
AST SERPL-CCNC: 39 U/L (ref 10–40)
BARBITURATES UR QL SCN>200 NG/ML: NEGATIVE
BASOPHILS # BLD AUTO: 0.05 K/UL (ref 0–0.2)
BASOPHILS NFR BLD: 0.4 % (ref 0–1.9)
BENZODIAZ UR QL SCN>200 NG/ML: NEGATIVE
BILIRUB SERPL-MCNC: 0.7 MG/DL (ref 0.1–1)
BILIRUB UR QL STRIP: NEGATIVE
BUN SERPL-MCNC: 11 MG/DL (ref 6–20)
BZE UR QL SCN: NEGATIVE
CALCIUM SERPL-MCNC: 10 MG/DL (ref 8.7–10.5)
CANNABINOIDS UR QL SCN: ABNORMAL
CHLORIDE SERPL-SCNC: 103 MMOL/L (ref 95–110)
CLARITY UR REFRACT.AUTO: CLEAR
CO2 SERPL-SCNC: 25 MMOL/L (ref 23–29)
COLOR UR AUTO: YELLOW
CREAT SERPL-MCNC: 1 MG/DL (ref 0.5–1.4)
CREAT UR-MCNC: 335 MG/DL (ref 23–375)
DIFFERENTIAL METHOD: ABNORMAL
EOSINOPHIL # BLD AUTO: 0.1 K/UL (ref 0–0.5)
EOSINOPHIL NFR BLD: 0.5 % (ref 0–8)
ERYTHROCYTE [DISTWIDTH] IN BLOOD BY AUTOMATED COUNT: 13.1 % (ref 11.5–14.5)
EST. GFR  (NO RACE VARIABLE): >60 ML/MIN/1.73 M^2
ETHANOL SERPL-MCNC: <10 MG/DL
GLUCOSE SERPL-MCNC: 109 MG/DL (ref 70–110)
GLUCOSE UR QL STRIP: NEGATIVE
HCT VFR BLD AUTO: 47.7 % (ref 40–54)
HCV AB SERPL QL IA: NORMAL
HGB BLD-MCNC: 15.8 G/DL (ref 14–18)
HGB UR QL STRIP: NEGATIVE
HIV 1+2 AB+HIV1 P24 AG SERPL QL IA: NORMAL
IMM GRANULOCYTES # BLD AUTO: 0.07 K/UL (ref 0–0.04)
IMM GRANULOCYTES NFR BLD AUTO: 0.5 % (ref 0–0.5)
KETONES UR QL STRIP: NEGATIVE
LEUKOCYTE ESTERASE UR QL STRIP: NEGATIVE
LYMPHOCYTES # BLD AUTO: 2.6 K/UL (ref 1–4.8)
LYMPHOCYTES NFR BLD: 19.8 % (ref 18–48)
MCH RBC QN AUTO: 28.6 PG (ref 27–31)
MCHC RBC AUTO-ENTMCNC: 33.1 G/DL (ref 32–36)
MCV RBC AUTO: 86 FL (ref 82–98)
METHADONE UR QL SCN>300 NG/ML: NEGATIVE
MONOCYTES # BLD AUTO: 0.5 K/UL (ref 0.3–1)
MONOCYTES NFR BLD: 4 % (ref 4–15)
NEUTROPHILS # BLD AUTO: 9.7 K/UL (ref 1.8–7.7)
NEUTROPHILS NFR BLD: 74.8 % (ref 38–73)
NITRITE UR QL STRIP: NEGATIVE
NRBC BLD-RTO: 0 /100 WBC
OPIATES UR QL SCN: NEGATIVE
PCP UR QL SCN>25 NG/ML: NEGATIVE
PH UR STRIP: 6 [PH] (ref 5–8)
PLATELET # BLD AUTO: 326 K/UL (ref 150–450)
PMV BLD AUTO: 8.7 FL (ref 9.2–12.9)
POTASSIUM SERPL-SCNC: 3.7 MMOL/L (ref 3.5–5.1)
PROT SERPL-MCNC: 7.9 G/DL (ref 6–8.4)
PROT UR QL STRIP: ABNORMAL
RBC # BLD AUTO: 5.53 M/UL (ref 4.6–6.2)
SARS-COV-2 RDRP RESP QL NAA+PROBE: NEGATIVE
SODIUM SERPL-SCNC: 142 MMOL/L (ref 136–145)
SP GR UR STRIP: >1.03 (ref 1–1.03)
TOXICOLOGY INFORMATION: ABNORMAL
TSH SERPL DL<=0.005 MIU/L-ACNC: 2.13 UIU/ML (ref 0.4–4)
URN SPEC COLLECT METH UR: ABNORMAL
WBC # BLD AUTO: 12.9 K/UL (ref 3.9–12.7)

## 2022-11-27 PROCEDURE — 87389 HIV-1 AG W/HIV-1&-2 AB AG IA: CPT | Performed by: PHYSICIAN ASSISTANT

## 2022-11-27 PROCEDURE — U0002 COVID-19 LAB TEST NON-CDC: HCPCS | Performed by: EMERGENCY MEDICINE

## 2022-11-27 PROCEDURE — 99285 EMERGENCY DEPT VISIT HI MDM: CPT

## 2022-11-27 PROCEDURE — 86803 HEPATITIS C AB TEST: CPT | Performed by: PHYSICIAN ASSISTANT

## 2022-11-27 PROCEDURE — 25000003 PHARM REV CODE 250: Performed by: EMERGENCY MEDICINE

## 2022-11-27 PROCEDURE — S4991 NICOTINE PATCH NONLEGEND: HCPCS | Performed by: EMERGENCY MEDICINE

## 2022-11-27 PROCEDURE — 84443 ASSAY THYROID STIM HORMONE: CPT | Performed by: EMERGENCY MEDICINE

## 2022-11-27 PROCEDURE — 99284 EMERGENCY DEPT VISIT MOD MDM: CPT | Mod: CS,,, | Performed by: EMERGENCY MEDICINE

## 2022-11-27 PROCEDURE — 80307 DRUG TEST PRSMV CHEM ANLYZR: CPT | Performed by: EMERGENCY MEDICINE

## 2022-11-27 PROCEDURE — 80143 DRUG ASSAY ACETAMINOPHEN: CPT | Performed by: EMERGENCY MEDICINE

## 2022-11-27 PROCEDURE — 99284 PR EMERGENCY DEPT VISIT,LEVEL IV: ICD-10-PCS | Mod: CS,,, | Performed by: EMERGENCY MEDICINE

## 2022-11-27 PROCEDURE — 82077 ASSAY SPEC XCP UR&BREATH IA: CPT | Performed by: EMERGENCY MEDICINE

## 2022-11-27 PROCEDURE — 81003 URINALYSIS AUTO W/O SCOPE: CPT | Mod: 59 | Performed by: EMERGENCY MEDICINE

## 2022-11-27 PROCEDURE — 80053 COMPREHEN METABOLIC PANEL: CPT | Performed by: EMERGENCY MEDICINE

## 2022-11-27 PROCEDURE — 85025 COMPLETE CBC W/AUTO DIFF WBC: CPT | Performed by: EMERGENCY MEDICINE

## 2022-11-27 RX ORDER — IBUPROFEN 200 MG
1 TABLET ORAL DAILY
Status: DISCONTINUED | OUTPATIENT
Start: 2022-11-27 | End: 2022-11-28 | Stop reason: HOSPADM

## 2022-11-27 RX ADMIN — NICOTINE 1 PATCH: 14 PATCH, EXTENDED RELEASE TRANSDERMAL at 11:11

## 2022-11-28 VITALS
BODY MASS INDEX: 37.25 KG/M2 | HEIGHT: 72 IN | OXYGEN SATURATION: 97 % | HEART RATE: 94 BPM | TEMPERATURE: 98 F | WEIGHT: 275 LBS | DIASTOLIC BLOOD PRESSURE: 95 MMHG | SYSTOLIC BLOOD PRESSURE: 152 MMHG | RESPIRATION RATE: 18 BRPM

## 2022-11-28 NOTE — ED NOTES
Pt's belongings placed in safe with pt specific label and bag by Charge GILBERTO Golden: 1 wallet with 6 cards and $270, 1 cell phone, 1 ring, 1 bracelet    Pt's belongings placed in locked closet with pt specific label and ba pair of pants, 1 pair of basketball shorts, 1 tank top, 1 pair of sandals, 1 pair of sock, 1 shirt    Original PEC paperwork remains at  with .
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
## 2022-11-28 NOTE — ED NOTES
Pt escorted out of ED by 2 [facility] personnel, security, and ED tech. Pt's belongings and PEC paperwork given to [facility] personnel.

## 2022-11-28 NOTE — ED NOTES
Pt resting quietly on stretcher; remains calm and cooperative. Sitter remains at bedside in direct visual contact, charting per protocol every 15 minutes. No equipment or belongings are in the patients room.  Pt denies needs or complaints at this time.  Bed locked in lowest position; side rails up and locked x 2.  Pt instructed to alert sitter or nurse for assistance and before attempting to get out of bed; verbalizes understanding.  Will continue to monitor pt.
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
## 2023-10-09 ENCOUNTER — HOSPITAL ENCOUNTER (EMERGENCY)
Facility: HOSPITAL | Age: 23
Discharge: HOME | End: 2023-10-09
Attending: EMERGENCY MEDICINE
Payer: MEDICAID

## 2023-10-09 VITALS
DIASTOLIC BLOOD PRESSURE: 94 MMHG | HEART RATE: 64 BPM | SYSTOLIC BLOOD PRESSURE: 163 MMHG | HEIGHT: 72 IN | BODY MASS INDEX: 30.75 KG/M2 | RESPIRATION RATE: 18 BRPM | OXYGEN SATURATION: 97 % | WEIGHT: 227 LBS | TEMPERATURE: 98 F

## 2023-10-09 DIAGNOSIS — M25.561 CHRONIC PAIN OF RIGHT KNEE: Primary | ICD-10-CM

## 2023-10-09 DIAGNOSIS — G89.29 CHRONIC PAIN OF RIGHT KNEE: Primary | ICD-10-CM

## 2023-10-09 PROCEDURE — 99283 EMERGENCY DEPT VISIT LOW MDM: CPT

## 2023-10-09 PROCEDURE — 25000003 PHARM REV CODE 250

## 2023-10-09 RX ORDER — IBUPROFEN 600 MG/1
600 TABLET ORAL
Status: COMPLETED | OUTPATIENT
Start: 2023-10-09 | End: 2023-10-09

## 2023-10-09 RX ORDER — IBUPROFEN 600 MG/1
600 TABLET ORAL EVERY 8 HOURS PRN
Qty: 20 TABLET | Refills: 0 | Status: SHIPPED | OUTPATIENT
Start: 2023-10-09 | End: 2023-10-19

## 2023-10-09 RX ADMIN — IBUPROFEN 600 MG: 600 TABLET ORAL at 02:10

## 2023-10-09 NOTE — DISCHARGE INSTRUCTIONS
Rest knee when experiencing inflammation/discomfort  You can wrapped Ace wrap around the knee to help with swelling, apply ice  I have also prescribed anti-inflammatories take as directed  I have also placed a referral to sports Medicine and advise you to follow up

## 2023-10-09 NOTE — ED TRIAGE NOTES
Pt. Is a 23 yr old  male presenting to the ED with pain and swelling in the right knee. Pt. Knee stuck between a fence when wrestling with mother.

## 2023-10-09 NOTE — FIRST PROVIDER EVALUATION
"Medical screening examination initiated.  I have conducted a focused provider triage encounter, findings are as follows:    Brief history of present illness:  24yo M with "fluid in R kneecap". Hx similar in the past, got medicine that made it better quickly. Twisted it and pain and swelling worsened. Needs Dr. Mendoza for work.     Vitals:    10/09/23 1309   BP: (!) 147/68   Pulse: 100   Resp: 16   Temp: 98.4 °F (36.9 °C)   TempSrc: Oral   SpO2: 97%   Weight: 103 kg (227 lb)   Height: 6' (1.829 m)       Pertinent physical exam:  ambulatory with mild limp. Brace on R knee    Preliminary workup initiated; this workup will be continued and followed by the physician or advanced practice provider that is assigned to the patient when roomed.  "

## 2023-10-09 NOTE — ED PROVIDER NOTES
Encounter Date: 10/9/2023       History     Chief Complaint   Patient presents with    Knee Pain     R knee pain and swellling     23-year-old male presents emergency department due to right knee discomfort with minimal swelling.  Patient reports he is intermittent discomfort in the need for several years after wrestling incident with his mother.  He was previously evaluated at the emergency department at 8:20 p.m. for same presentation and negative x-rays.  Patient was given a referral to sports Medicine however he reports he did not follow up as symptoms had improved with.  Patient reports on Friday he jumped down from a trunk which aggravated his knee.  He however has still been able to weightbear without difficulty with the weekend.  New symptoms such as fever nausea vomiting.  No difficulty with range of motion.      Review of patient's allergies indicates:  No Known Allergies  Past Medical History:   Diagnosis Date    ADD (attention deficit disorder)     Bipolar disorder     Knee pain, bilateral     Mandible open fracture     STD exposure      Past Surgical History:   Procedure Laterality Date    FRACTURE SURGERY      MANDIBLE SURGERY      TONSILLECTOMY      TYMPANOSTOMY TUBE PLACEMENT       Family History   Problem Relation Age of Onset    No Known Problems Mother     No Known Problems Father      Social History     Tobacco Use    Smoking status: Every Day     Current packs/day: 1.00     Types: Cigarettes    Smokeless tobacco: Never   Substance Use Topics    Alcohol use: No    Drug use: No     Review of Systems  HPI  Physical Exam     Initial Vitals [10/09/23 1309]   BP Pulse Resp Temp SpO2   (!) 147/68 100 16 98.4 °F (36.9 °C) 97 %      MAP       --         Physical Exam    Vitals reviewed.  Constitutional: He appears well-developed.   HENT:   Head: Normocephalic and atraumatic.   Eyes: Conjunctivae and EOM are normal.   Neck:   Normal range of motion.  Cardiovascular:  Normal rate.            Pulmonary/Chest: No respiratory distress.   Abdominal: Abdomen is soft. He exhibits no distension. There is no abdominal tenderness. There is no rebound.   Musculoskeletal:         General: Normal range of motion.      Cervical back: Normal range of motion.      Comments: Patient was able to perform flexion of right knee in extension without difficulty.  No focal area tenderness.  There is a small lateral effusion of the patella however no significant overlying erythema, no induration.     Neurological: He is alert and oriented to person, place, and time.   Skin: Skin is warm and dry.   Psychiatric: He has a normal mood and affect. Thought content normal.         ED Course   Procedures  Labs Reviewed - No data to display       Imaging Results    None          Medications   ibuprofen tablet 600 mg (has no administration in time range)     Medical Decision Making  23-year-old male presents emergency department due to right knee discomfort.  In reviewing chart patient had a visit in 2020 regarding same knee after wrestling accident.  No obvious deformity on exam, no traumatic injury them which I would suspect a fracture dislocation.  Patient was weight-bearing.  Possible patient has a old ligamentous injury from initial trauma several years ago.  I will replace referral to sports Medicine and discharge patient with Ace wrap and anti-inflammatories.  Patient discharged home  Pt discussed with supervising physician                                 Clinical Impression:   Final diagnoses:  [M25.561, G89.29] Chronic pain of right knee (Primary)               Mai Vargas, LUIGI  10/09/23 9716

## 2023-10-09 NOTE — ED NOTES
Ace wrap applied to knee, extra supplies given, med per orders, work excuse given, Discharge home , states understanding to follow up as directed. Ambulates out of ED without difficulty. RX given,

## 2023-10-09 NOTE — Clinical Note
"Jay IZAGUIRRE" Cory was seen and treated in our emergency department on 10/9/2023.  He may return to work on 10/10/2023.       If you have any questions or concerns, please don't hesitate to call.      EULALIA MACIAS RN    "

## 2024-10-19 ENCOUNTER — HOSPITAL ENCOUNTER (EMERGENCY)
Facility: HOSPITAL | Age: 24
Discharge: HOME | End: 2024-10-19
Attending: STUDENT IN AN ORGANIZED HEALTH CARE EDUCATION/TRAINING PROGRAM

## 2024-10-19 VITALS
SYSTOLIC BLOOD PRESSURE: 144 MMHG | BODY MASS INDEX: 37.11 KG/M2 | RESPIRATION RATE: 16 BRPM | HEART RATE: 80 BPM | OXYGEN SATURATION: 98 % | DIASTOLIC BLOOD PRESSURE: 72 MMHG | TEMPERATURE: 98 F | HEIGHT: 73 IN | WEIGHT: 280 LBS

## 2024-10-19 DIAGNOSIS — J06.9 UPPER RESPIRATORY TRACT INFECTION, UNSPECIFIED TYPE: Primary | ICD-10-CM

## 2024-10-19 LAB
INFLUENZA A, MOLECULAR: NEGATIVE
INFLUENZA B, MOLECULAR: NEGATIVE
SARS-COV-2 RDRP RESP QL NAA+PROBE: NEGATIVE
SPECIMEN SOURCE: NORMAL

## 2024-10-19 PROCEDURE — 99283 EMERGENCY DEPT VISIT LOW MDM: CPT

## 2024-10-19 PROCEDURE — 25000003 PHARM REV CODE 250: Performed by: STUDENT IN AN ORGANIZED HEALTH CARE EDUCATION/TRAINING PROGRAM

## 2024-10-19 PROCEDURE — 87635 SARS-COV-2 COVID-19 AMP PRB: CPT | Performed by: STUDENT IN AN ORGANIZED HEALTH CARE EDUCATION/TRAINING PROGRAM

## 2024-10-19 PROCEDURE — 87502 INFLUENZA DNA AMP PROBE: CPT | Performed by: STUDENT IN AN ORGANIZED HEALTH CARE EDUCATION/TRAINING PROGRAM

## 2024-10-19 RX ORDER — IBUPROFEN 400 MG/1
800 TABLET ORAL
Status: COMPLETED | OUTPATIENT
Start: 2024-10-19 | End: 2024-10-19

## 2024-10-19 RX ORDER — GUAIFENESIN 100 MG/5ML
100-200 SOLUTION ORAL EVERY 4 HOURS PRN
Qty: 60 ML | Refills: 0 | Status: SHIPPED | OUTPATIENT
Start: 2024-10-19 | End: 2024-10-29

## 2024-10-19 RX ORDER — GUAIFENESIN 100 MG/5ML
100-200 SOLUTION ORAL EVERY 4 HOURS PRN
Qty: 60 ML | Refills: 0 | Status: SHIPPED | OUTPATIENT
Start: 2024-10-19 | End: 2024-10-19

## 2024-10-19 RX ORDER — ACETAMINOPHEN 500 MG
1000 TABLET ORAL
Status: COMPLETED | OUTPATIENT
Start: 2024-10-19 | End: 2024-10-19

## 2024-10-19 RX ORDER — DEXTROMETHORPHAN HYDROBROMIDE, GUAIFENESIN 5; 100 MG/5ML; MG/5ML
650 LIQUID ORAL EVERY 8 HOURS
COMMUNITY

## 2024-10-19 RX ADMIN — IBUPROFEN 800 MG: 400 TABLET ORAL at 01:10

## 2024-10-19 RX ADMIN — ACETAMINOPHEN 1000 MG: 500 TABLET ORAL at 01:10

## 2024-10-19 NOTE — ED PROVIDER NOTES
Chief Complaint   Multiple complaints (Body aches, called in at work, cough, congestion, nose was running, ha, sore throat , PT NOT ALTERED MENTAL STATUS WAS ACCIDENT)      History Of Present Illness   Jay Cory is a 24 y.o. male with no pertinent PMHx presenting with flu--like sxs.  Patient states that since yesterday he has been having sore throat, cough productive of green and clear sputum, congestion, rhinorrhea, headache, and body aches.  No medications taken prior to arrival.  Reports no known fevers, or chills.  Reports no vomiting, diarrhea.  Reports no chest pain, shortness of breath, abdominal pain.  No known sick contacts.    Independent Historian: Yes  Other Historian or Collateral: Chart review  Interpretor: No      Review of patient's allergies indicates:  No Known Allergies    No current facility-administered medications on file prior to encounter.     Current Outpatient Medications on File Prior to Encounter   Medication Sig Dispense Refill    acetaminophen (TYLENOL) 650 MG TbSR Take 650 mg by mouth every 8 (eight) hours.      cetirizine (ZYRTEC) 10 MG tablet Take 1 tablet (10 mg total) by mouth every morning. 30 tablet 0       Past History   As per HPI and below:  Past Medical History:   Diagnosis Date    ADD (attention deficit disorder)     Bipolar disorder     Knee pain, bilateral     Mandible open fracture     STD exposure      Past Surgical History:   Procedure Laterality Date    FRACTURE SURGERY      MANDIBLE SURGERY      TONSILLECTOMY      TYMPANOSTOMY TUBE PLACEMENT         Social History     Socioeconomic History    Marital status: Single   Occupational History    Occupation: tree trimming   Tobacco Use    Smoking status: Every Day     Current packs/day: 1.00     Types: Cigarettes    Smokeless tobacco: Never   Substance and Sexual Activity    Alcohol use: No    Drug use: No    Sexual activity: Yes     Partners: Female     Birth control/protection: None       Family History   Problem  "Relation Name Age of Onset    No Known Problems Mother      No Known Problems Father         Physical Exam     Vitals:    10/19/24 1240 10/19/24 1439   BP: (!) 163/72 (!) 144/72   Pulse: 85 80   Resp: 16 16   Temp: 98.7 °F (37.1 °C) 98 °F (36.7 °C)   TempSrc: Oral    SpO2: 97% 98%   Weight: 127 kg (280 lb)    Height: 6' 1" (1.854 m)        Physical Exam  Constitutional:       General: He is not in acute distress.     Appearance: He is well-developed. He is not toxic-appearing or diaphoretic.   HENT:      Head: Normocephalic and atraumatic.      Nose: No congestion.      Mouth/Throat:      Mouth: Mucous membranes are moist.      Pharynx: Oropharynx is clear. Posterior oropharyngeal erythema present. No oropharyngeal exudate.   Eyes:      General: No scleral icterus.     Extraocular Movements: Extraocular movements intact.      Pupils: Pupils are equal, round, and reactive to light.   Cardiovascular:      Rate and Rhythm: Normal rate and regular rhythm.   Pulmonary:      Effort: No respiratory distress.      Breath sounds: No wheezing or rhonchi.   Abdominal:      General: There is no distension.      Tenderness: There is no abdominal tenderness. There is no guarding.   Musculoskeletal:         General: No swelling or deformity.      Cervical back: No rigidity.   Skin:     General: Skin is warm and dry.      Capillary Refill: Capillary refill takes less than 2 seconds.   Neurological:      General: No focal deficit present.      Mental Status: He is alert and oriented to person, place, and time.             Results     Labs Reviewed   INFLUENZA A & B BY MOLECULAR       Result Value    Influenza A, Molecular Negative      Influenza B, Molecular Negative      Flu A & B Source Nasal swab     SARS-COV-2 RNA AMPLIFICATION, QUAL    SARS-CoV-2 RNA, Amplification, Qual Negative         Imaging Results    None           Initial MDM   Medical Decision Making  Patient is a 24-year-old male presenting with flu-like symptoms.  " Nontoxic appearing on exam.  No evidence of pneumonia based on exam.  Do not feel that x-ray be indicated at this time.  Will get COVID and flu testing at this time.  Will treat patient's symptoms with Tylenol and Motrin in the meantime.    Risk  OTC drugs.  Prescription drug management.                  Medications Given / Interventions     Medications   acetaminophen tablet 1,000 mg (1,000 mg Oral Given 10/19/24 1333)   ibuprofen tablet 800 mg (800 mg Oral Given 10/19/24 1332)       Procedures     ED POCUS Performed: No    Reassessment and ED Course     ED Course as of 10/22/24 0712   Sat Oct 19, 2024   1429 Influenza A, Molecular: Negative [CH]   1429 Influenza B, Molecular: Negative [CH]   1429 SARS-CoV-2 RNA, Amplification, Qual: Negative [CH]      ED Course User Index  [CH] Gita Fong MD              Final diagnoses:  [J06.9] Upper respiratory tract infection, unspecified type (Primary)                 Dispo      ED Disposition Condition    Discharge Stable            ED Prescriptions       Medication Sig Dispense Start Date End Date Auth. Provider    guaiFENesin 100 mg/5 ml (ROBITUSSIN) 100 mg/5 mL syrup  (Status: Discontinued) Take 5-10 mLs (100-200 mg total) by mouth every 4 (four) hours as needed for Cough. 60 mL 10/19/2024 10/19/2024 Gita Fong MD    guaiFENesin 100 mg/5 ml (ROBITUSSIN) 100 mg/5 mL syrup Take 5-10 mLs (100-200 mg total) by mouth every 4 (four) hours as needed for Cough. 60 mL 10/19/2024 10/29/2024 Gita Fong MD          Follow-up Information    None                     Gita Fong MD  10/22/24 0712

## 2024-10-19 NOTE — Clinical Note
"Jose"JOSE" Cory was seen and treated in our emergency department on 10/19/2024.  He may return to work on 10/22/2024.       If you have any questions or concerns, please don't hesitate to call.      Gita Fong MD"

## 2024-10-19 NOTE — ED TRIAGE NOTES
To the ED with c/o nasty taste of mucus when he coughs, lack of energy, eyes tearing, sore throat, forced himself to get here, knows for sure he'll feel worse later today.  Needs work excuse and strong cough syrup and 2-3 days excuse for work.

## 2024-10-19 NOTE — ED NOTES
LOC: The patient is awake, alert, and oriented to self, place, time, and situation. Pt is calm and cooperative. Affect is appropriate.  Speech is appropriate and clear.     APPEARANCE: Patient resting comfortably in no acute distress.  Patient is clean and well groomed.    SKIN: The skin is warm and dry; color consistent with ethnicity.  Patient has normal skin turgor and moist mucus membranes.  Skin intact; no breakdown or bruising noted.     MUSCULOSKELETAL: Patient moving upper and lower extremities without difficulty; denies pain in the extremities or back.  Denies weakness.     RESPIRATORY: Airway is open and patent. Respirations spontaneous, even, easy, and non-labored.  Patient has a normal effort and rate.  No accessory muscle use noted. Denies cough.     CARDIAC:  .  No peripheral edema noted. No complaints of chest pain.     ABDOMEN: Soft and non tender to palpation.  No distention noted. Pt denies abdominal pain; denies nausea, vomiting, diarrhea, or constipation.    NEUROLOGIC: Eyes open spontaneously.  Behavior appropriate to situation.  Follows commands; facial expression symmetrical.  Purposeful motor response noted; normal sensation in all extremities. Pt denies headache; denies lightheadedness or dizziness; denies visual disturbances; denies loss of balance; denies unilateral weakness.

## 2024-12-14 ENCOUNTER — HOSPITAL ENCOUNTER (EMERGENCY)
Facility: HOSPITAL | Age: 24
Discharge: HOME | End: 2024-12-14
Attending: STUDENT IN AN ORGANIZED HEALTH CARE EDUCATION/TRAINING PROGRAM

## 2024-12-14 VITALS
SYSTOLIC BLOOD PRESSURE: 132 MMHG | RESPIRATION RATE: 18 BRPM | DIASTOLIC BLOOD PRESSURE: 63 MMHG | OXYGEN SATURATION: 97 % | HEART RATE: 83 BPM | BODY MASS INDEX: 33.13 KG/M2 | WEIGHT: 250 LBS | TEMPERATURE: 97 F | HEIGHT: 73 IN

## 2024-12-14 DIAGNOSIS — R05.9 COUGH, UNSPECIFIED TYPE: Primary | ICD-10-CM

## 2024-12-14 PROCEDURE — 99282 EMERGENCY DEPT VISIT SF MDM: CPT

## 2024-12-14 PROCEDURE — 0241U SARS-COV2 (COVID) WITH FLU/RSV BY PCR: CPT | Performed by: STUDENT IN AN ORGANIZED HEALTH CARE EDUCATION/TRAINING PROGRAM

## 2024-12-14 RX ORDER — GUAIFENESIN AND DEXTROMETHORPHAN HYDROBROMIDE 1200; 60 MG/1; MG/1
1 TABLET, EXTENDED RELEASE ORAL 2 TIMES DAILY
Qty: 10 TABLET | Refills: 0 | Status: SHIPPED | OUTPATIENT
Start: 2024-12-14 | End: 2024-12-19

## 2024-12-14 NOTE — Clinical Note
"Jose"JOSE" Cory was seen and treated in our emergency department on 12/14/2024.  He may return to work on 12/17/2024.       If you have any questions or concerns, please don't hesitate to call.      Gomez Back MD"

## 2024-12-15 LAB
INFLUENZA A, MOLECULAR: NOT DETECTED
INFLUENZA B, MOLECULAR: NOT DETECTED
RSV AG BY MOLECULAR METHOD: NOT DETECTED
SARS-COV-2 RNA RESP QL NAA+PROBE: NOT DETECTED

## 2024-12-15 NOTE — ED PROVIDER NOTES
Encounter Date: 12/14/2024       History     Chief Complaint   Patient presents with    Cough     Patient reports that he has been having a cough and congestion for a few days.     This is a 24-year-old male who presents to the ED for cough and congestion for 2-3 days.  He denies any known sick contacts.  He states this is in the setting of some ongoing congestion for the past month or so and he states that he tested himself for COVID about a month ago and it was negative.  He denies any shortness of breath, chest pain, fevers, chills.  He states he just came in to be examined and is requesting a work note for the next day until he feels better.  He declines any testing at this time.        Review of patient's allergies indicates:  No Known Allergies  Past Medical History:   Diagnosis Date    ADD (attention deficit disorder)     Bipolar disorder     Knee pain, bilateral     Mandible open fracture     STD exposure      Past Surgical History:   Procedure Laterality Date    FRACTURE SURGERY      MANDIBLE SURGERY      TONSILLECTOMY      TYMPANOSTOMY TUBE PLACEMENT       Family History   Problem Relation Name Age of Onset    No Known Problems Mother      No Known Problems Father       Social History     Tobacco Use    Smoking status: Every Day     Current packs/day: 1.00     Types: Cigarettes    Smokeless tobacco: Never   Substance Use Topics    Alcohol use: No    Drug use: No     Review of Systems   Constitutional:  Negative for fever.   HENT:  Positive for congestion. Negative for sore throat.    Respiratory:  Positive for cough. Negative for shortness of breath.    Cardiovascular:  Negative for chest pain.   Gastrointestinal:  Negative for nausea.   Genitourinary:  Negative for dysuria.   Musculoskeletal:  Negative for back pain.   Skin:  Negative for rash.   Neurological:  Negative for weakness.   Hematological:  Does not bruise/bleed easily.       Physical Exam     Initial Vitals [12/14/24 2203]   BP Pulse Resp Temp  SpO2   123/72 91 18 97.5 °F (36.4 °C) 95 %      MAP       --         Physical Exam    Nursing note and vitals reviewed.  Constitutional: He appears well-developed and well-nourished.   HENT:   Head: Normocephalic and atraumatic.   Eyes: EOM are normal. Pupils are equal, round, and reactive to light.   Neck: Neck supple.   Normal range of motion.  Cardiovascular:  Normal rate and regular rhythm.           Pulmonary/Chest: Breath sounds normal. No respiratory distress.   Abdominal: Abdomen is soft. He exhibits no distension. There is no abdominal tenderness. There is no rebound.   Musculoskeletal:         General: No tenderness or edema. Normal range of motion.      Cervical back: Normal range of motion and neck supple.     Neurological: He is alert and oriented to person, place, and time. No cranial nerve deficit.   Skin: Skin is warm and dry.         ED Course   Procedures  Labs Reviewed   SARS-COV2 (COVID) WITH FLU/RSV BY PCR       Result Value    SARS-CoV2 (COVID-19) Qualitative PCR Not Detected      Influenza A, Molecular Not Detected      Influenza B, Molecular Not Detected      RSV Ag by Molecular Method Not Detected            Imaging Results    None          Medications - No data to display  Medical Decision Making  24-year-old male who is otherwise healthy presents for acute cough and subacute congestion.  Cough has been ongoing for the past 2-3 days.  It is nonproductive and he has no other associated symptoms.  Patient otherwise feels well, has had good p.o. intake.  He is requesting medication to help with his cough, declines any workup including x-ray or viral testing at this time.  I suspect that his symptoms are likely related to a viral upper respiratory infection.  Patient is otherwise well-appearing, vital signs are normal oxygen is normal on room air and he is without respiratory distress.  He was not amenable to rapid flu, RSV, COVID swab, but requested discharge prior to results being  available.    Strict return precautions were discussed, patient verbalized understanding and agreed with plan.  Patient discharged home.      Risk  OTC drugs.                                      Clinical Impression:  Final diagnoses:  [R05.9] Cough, unspecified type (Primary)          ED Disposition Condition    Discharge Stable          ED Prescriptions       Medication Sig Dispense Start Date End Date Auth. Provider    dextromethorphan-guaiFENesin (MUCINEX DM) 60-1,200 mg per 12 hr tablet () Take 1 tablet by mouth 2 (two) times a day. for 5 days 10 tablet 2024 Gomez Back MD          Follow-up Information    None          Gomez Back MD  [phone]

## 2025-03-05 ENCOUNTER — HOSPITAL ENCOUNTER (EMERGENCY)
Facility: HOSPITAL | Age: 25
Discharge: HOME | End: 2025-03-05
Attending: EMERGENCY MEDICINE

## 2025-03-05 ENCOUNTER — HOSPITAL ENCOUNTER (EMERGENCY)
Facility: HOSPITAL | Age: 25
Discharge: LEFT BEFORE DISCHARGE | End: 2025-03-05

## 2025-03-05 VITALS
SYSTOLIC BLOOD PRESSURE: 131 MMHG | DIASTOLIC BLOOD PRESSURE: 85 MMHG | HEIGHT: 73 IN | TEMPERATURE: 98 F | HEART RATE: 82 BPM | RESPIRATION RATE: 20 BRPM | BODY MASS INDEX: 33.02 KG/M2 | WEIGHT: 249.13 LBS | OXYGEN SATURATION: 96 %

## 2025-03-05 VITALS
SYSTOLIC BLOOD PRESSURE: 145 MMHG | OXYGEN SATURATION: 95 % | HEIGHT: 73 IN | HEART RATE: 84 BPM | BODY MASS INDEX: 33.02 KG/M2 | WEIGHT: 249.13 LBS | RESPIRATION RATE: 20 BRPM | DIASTOLIC BLOOD PRESSURE: 79 MMHG | TEMPERATURE: 98 F

## 2025-03-05 DIAGNOSIS — G89.29 CHRONIC PAIN: ICD-10-CM

## 2025-03-05 DIAGNOSIS — M25.461 EFFUSION OF RIGHT KNEE: ICD-10-CM

## 2025-03-05 DIAGNOSIS — S83.91XA SPRAIN OF RIGHT KNEE, UNSPECIFIED LIGAMENT, INITIAL ENCOUNTER: Primary | ICD-10-CM

## 2025-03-05 DIAGNOSIS — M25.569 KNEE PAIN, UNSPECIFIED CHRONICITY, UNSPECIFIED LATERALITY: ICD-10-CM

## 2025-03-05 PROCEDURE — 99999 HC NO LEVEL OF SERVICE - ED ONLY: CPT

## 2025-03-05 PROCEDURE — 99283 EMERGENCY DEPT VISIT LOW MDM: CPT

## 2025-03-05 RX ORDER — IBUPROFEN 400 MG/1
200 TABLET ORAL EVERY 6 HOURS PRN
Qty: 60 TABLET | Refills: 0 | Status: SHIPPED | OUTPATIENT
Start: 2025-03-05

## 2025-03-05 NOTE — FIRST PROVIDER EVALUATION
"Medical screening examination initiated.  I have conducted a focused provider triage encounter, findings are as follows:    Brief history of present illness:      Acute on chronic right knee pain  Was here earlier and then left      Vitals:    03/05/25 1301   BP: 131/85   Pulse: 82   Resp: 20   Temp: 98.3 °F (36.8 °C)   TempSrc: Oral   SpO2: 96%   Weight: 113 kg (249 lb 1.9 oz)   Height: 6' 1" (1.854 m)       Pertinent physical exam:      A&OX3    Brief workup plan:      XR    Preliminary workup initiated; this workup will be continued and followed by the physician or advanced practice provider that is assigned to the patient when roomed.  "

## 2025-03-05 NOTE — DISCHARGE INSTRUCTIONS
You may take Ibuprofen, 200-600 mg every 4-6 hours as needed for pain. This can be very irritating to your stomach, so we recommend you take this with food or with an antacid.     You may take Tylenol (acetaminophen) 1-2 tabs, every 4- hours, as needed for pain to a MAXIMUM of 3,000 mg in 24 hours.     We recommend you rest your right knee as much as possible when you have pain or swelling.   While you are resting, we recommend you elevate your right leg, and you may apply an ice pack wrapped in a towel for up to 20 minutes 2-3 times daily.     Return to the ED immediately for any increased swelling of your right knee, redness of your right knee, swelling of your right leg, new numbness, tingling, or weakness anywhere, fever greater than 101F or any additional concerns.

## 2025-03-05 NOTE — ED TRIAGE NOTES
Jay Ray, a 24 y.o. male presents to the ED w/ complaint of right knee pain.    Triage note:  Chief Complaint   Patient presents with    Knee Pain     [facility] earlier reports chronic R knee pain     Review of patient's allergies indicates:  No Known Allergies  Past Medical History:   Diagnosis Date    ADD (attention deficit disorder)     Bipolar disorder     Knee pain, bilateral     Mandible open fracture     STD exposure        Pre-op Diagnosis: Colonic mass [K63.89]    The above referenced H&P was reviewed by Vivian Mchugh MD on 1/20/2021, the patient was examined and no significant changes have occurred in the patient's condition since the H&P was performed.   I discussed with

## 2025-03-05 NOTE — ED PROVIDER NOTES
Encounter Date: 3/5/2025       History     Chief Complaint   Patient presents with    Knee Pain     [facility] earlier reports chronic R knee pain     HPI patient is a 24-year-old male without significant past medical history who comes to the emergency department for evaluation of right knee pain.  The patient notes for several months to year persistent right knee pain with intermittent swelling particularly worse after working long hours.  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  The patient notes no clear trauma to that leg in the past but did notice in the past that he felt a pop in his knee in his had pain ever since.  Patient denies any numbness tingling or weakness, no distal swelling, no fevers chills, no skin changes.  Review of patient's allergies indicates:  No Known Allergies  Past Medical History:   Diagnosis Date    ADD (attention deficit disorder)     Bipolar disorder     Knee pain, bilateral     Mandible open fracture     STD exposure      Past Surgical History:   Procedure Laterality Date    FRACTURE SURGERY      MANDIBLE SURGERY      TONSILLECTOMY      TYMPANOSTOMY TUBE PLACEMENT       Family History   Problem Relation Name Age of Onset    No Known Problems Mother      No Known Problems Father       Social History[1]      Physical Exam     Initial Vitals [03/05/25 1301]   BP Pulse Resp Temp SpO2   131/85 82 20 98.3 °F (36.8 °C) 96 %      MAP       --         Physical Exam       Nursing note and vitals reviewed.  Constitutional: Patient appears well-developed and well-nourished. No distress.   HENT:   Head: Normocephalic and atraumatic.   Eyes: Conjunctivae and EOM are normal. Pupils are equal, round, and reactive to light.   Neck: Neck supple.   Normal range of motion.  Cardiovascular: Normal rate, regular rhythm, normal heart sounds and intact distal pulses.   Pulmonary/Chest: Breath sounds normal.   Abdominal: Abdomen is soft. Patient exhibits no distension. There is no  abdominal tenderness.   Musculoskeletal:      Cervical back: Normal range of motion and neck supple.      R knee:  Tenderness palpation, mild tenderness palpation along medial joint line, mild effusion along medial joint line, no erythema, no deformity     No distal swelling     Left knee:  No erythema, no swelling, no effusion, no bony tenderness palpation  Neurological: Patient is alert and oriented to person, place, and time. No cranial nerve deficit. Gait normal. GCS score is 15.    Skin: Skin is warm and dry.  Psych: Normal mood/affect    ED Course   Procedures  Labs Reviewed   HEPATITIS C ANTIBODY   HIV 1 / 2 ANTIBODY          Imaging Results    None          Medications - No data to display  Medical Decision Making  Risk  Prescription drug management.                     Patient presents for evaluation of chronic right knee pain and intermittent edema.    The patient explains that he has had x-rays in the past for similar symptoms and declined imaging today but would like referral to specialty clinics to help him manage his symptoms.    The patient was referred to [facility], PT OT, provided with work restrictions and strict return to ED precautions.                 Clinical Impression:  Final diagnoses:  [G89.29] Chronic pain  [M25.569] Knee pain, unspecified chronicity, unspecified laterality  [S83.91XA] Sprain of right knee, unspecified ligament, initial encounter (Primary)  [M25.461] Effusion of right knee          ED Disposition Condition    Discharge Stable          ED Prescriptions    None       Follow-up Information       Follow up With Specialties Details Why Contact Info    [facility] Emergency Medicine  As needed, If symptoms worsen [address]  [facility] [address] [phone]               [1]   Social History  Tobacco Use    Smoking status: Every Day     Current packs/day: 1.00     Types: Cigarettes    Smokeless tobacco: Never   Substance Use  Topics    Alcohol use: No    Drug use: No        Susanne Sesay MD  03/10/25 1029

## 2025-03-05 NOTE — ED NOTES
Pt not in lobby upon being called. Attempted to call phone number on file, phone number does not work. Attempted to call contact number father with no answer.

## 2025-03-05 NOTE — Clinical Note
"Jay"SHAILESH Ray was seen and treated in our emergency department on 3/5/2025.  He may return to work on 03/05/2025.  Mr. Bose was seen in our emergency department on 03/05/2025 for an injury.    He has a right knee sprain, right knee effusion, and possible early right knee arthritis    He may return to full duties when he has right knee pain or swelling he should limit his lifting to 5-10 lb and may need to rest more frequently and elevate his right leg while at rest while he has pain and swelling.       If you have any questions or concerns, please don't hesitate to call.      Susanne Sesay MD"

## 2025-03-27 ENCOUNTER — OCCUPATIONAL HEALTH (OUTPATIENT)
Dept: URGENT CARE | Facility: CLINIC | Age: 25
End: 2025-03-27

## 2025-03-27 DIAGNOSIS — Z02.83 ENCOUNTER FOR DRUG SCREENING: Primary | ICD-10-CM

## 2025-03-27 NOTE — PROGRESS NOTES
Patient gave a temp out of range. SB Second collect was observed. Spoke with Anna Marie lindsay until [facility] comes back.

## 2025-05-16 ENCOUNTER — TELEPHONE (OUTPATIENT)
Dept: PRIMARY CARE CLINIC | Facility: CLINIC | Age: 25
End: 2025-05-16
Payer: MEDICAID

## 2025-05-16 NOTE — TELEPHONE ENCOUNTER
[facility] - Attempted to call patient and schedule a new PCP appt. SASHA with instructions to call [facility].